# Patient Record
Sex: FEMALE | URBAN - METROPOLITAN AREA
[De-identification: names, ages, dates, MRNs, and addresses within clinical notes are randomized per-mention and may not be internally consistent; named-entity substitution may affect disease eponyms.]

---

## 2024-03-29 ENCOUNTER — NURSE TRIAGE (OUTPATIENT)
Dept: NURSING | Facility: CLINIC | Age: 29
End: 2024-03-29

## 2024-03-30 NOTE — TELEPHONE ENCOUNTER
Patient calling reports having a tonsillectomy and septoplasty today with ongoing bleeding and severe difficulty breathing. Reports struggling and gasping for each breath. Advised to call 911 for EMS with patient agreeable to the plan.     Sanam Beckham RN 03/29/24 9:57 PM   [facility] [place] Triage Nurse Advisor      Reason for Disposition   SEVERE difficulty breathing (e.g., struggling for each breath, speaks in single words, stridor)    Protocols used: Post-Op Tonsil and Adenoid Surgery-A-

## 2024-08-06 ENCOUNTER — NURSE TRIAGE (OUTPATIENT)
Dept: NURSING | Facility: CLINIC | Age: 29
End: 2024-08-06

## 2024-08-06 NOTE — TELEPHONE ENCOUNTER
Pt calling wondering if she can get an MRI tomorrow. She is having a MRI on her brain for a cyst. She is also several weeks pregnant. Informed pt that MRI's can be done on pregnant women. She is also concerned about contrast. I am unsure as to whether or not she will receive contrast and it is through [facility]. Advised pt to speak with them tomorrow regarding that.    Sanam Hernandez RN  [facility] Nurse Advisor   8/6/2024  6:02 PM    Reason for Disposition   [1] Caller requesting NON-URGENT health information AND [2] PCP's office is the best resource    Additional Information   Negative: RN needs further essential information from caller in order to complete triage    Protocols used: Information Only Call - No Triage-A-